# Patient Record
Sex: MALE | Race: WHITE | URBAN - METROPOLITAN AREA
[De-identification: names, ages, dates, MRNs, and addresses within clinical notes are randomized per-mention and may not be internally consistent; named-entity substitution may affect disease eponyms.]

---

## 2017-11-15 ENCOUNTER — PAT TELEPHONE (OUTPATIENT)
Dept: PREADMISSION TESTING | Age: 72
End: 2017-11-15

## 2017-11-15 VITALS — WEIGHT: 228 LBS | BODY MASS INDEX: 31.92 KG/M2 | HEIGHT: 71 IN

## 2017-11-15 RX ORDER — ATORVASTATIN CALCIUM 40 MG/1
40 TABLET, FILM COATED ORAL DAILY
COMMUNITY

## 2017-11-15 RX ORDER — RANITIDINE 150 MG/1
150 TABLET ORAL 2 TIMES DAILY
COMMUNITY

## 2017-11-15 NOTE — PROGRESS NOTES
you have a living will and a durable power of  for healthcare, please bring in a copy. As part of our patient safety program to minimize surgical site infections, we ask you to do the following:    · Please notify your surgeon if you develop any illness between         now and the  day of your surgery. · This includes a cough, cold, fever, sore throat, nausea,         or vomiting, and diarrhea, etc.  ·  Please notify your surgeon if you experience dizziness, shortness         of breath or blurred vision between now and the time of your surgery. You may shower the night before surgery or the morning of   your surgery with an antibacterial soap. You will need to bring a photo ID and insurance card    [facility] has an onsite pharmacy, would you like to utilize our pharmacy     If you will be staying overnight and use a C-pap machine, please bring   your C-pap to hospital     Our goal is to provide you with excellent care, therefore, visitors will be limited to two(2) in the room at a time so that we may focus on providing this care for you. Please contact pre-admission testing if you have any further questions. [facility] phone number:  [phone]  Please note these are generalized instructions for all surgical cases, you may be provided with more specific instructions according to your surgery.

## 2017-11-24 NOTE — ANESTHESIA PRE-OP
[facility] Department of Anesthesiology  Pre-Anesthesia Evaluation/Consultation       Name:  Cristi Rudd  : 1945  Age:  73 y.o. MRN:  [id number]  Date: 2017           Procedure (Scheduled):  Colonoscopy  Surgeon:  Dr. Pato Walls [Amoxicillin] Rash     There is no problem list on file for this patient. Past Medical History:   Diagnosis Date    CAD (coronary artery disease)     Diverticulitis     Hyperlipidemia     Hypertension      Past Surgical History:   Procedure Laterality Date    ABDOMEN SURGERY      bowel resection diveticulitis    CARDIAC SURGERY      triple by pass    FOOT SURGERY       Social History   Substance Use Topics    Smoking status: Never Smoker    Smokeless tobacco: Never Used    Alcohol use Yes     Medications  Current Outpatient Prescriptions on File Prior to Encounter   Medication Sig Dispense Refill    metoprolol tartrate (LOPRESSOR) 25 MG tablet Take 25 mg by mouth 2 times daily      ranitidine (ZANTAC) 150 MG tablet Take 150 mg by mouth 2 times daily      atorvastatin (LIPITOR) 40 MG tablet Take 40 mg by mouth daily      aspirin 81 MG tablet Take 81 mg by mouth daily       No current facility-administered medications on file prior to encounter.       Current Outpatient Prescriptions   Medication Sig Dispense Refill    metoprolol tartrate (LOPRESSOR) 25 MG tablet Take 25 mg by mouth 2 times daily      ranitidine (ZANTAC) 150 MG tablet Take 150 mg by mouth 2 times daily      atorvastatin (LIPITOR) 40 MG tablet Take 40 mg by mouth daily      aspirin 81 MG tablet Take 81 mg by mouth daily       Current Facility-Administered Medications   Medication Dose Route Frequency Provider Last Rate Last Dose    0.9 % sodium chloride infusion   Intravenous Continuous Henrik Billings MD        sodium chloride flush 0.9 % injection 10 mL  10 mL Intravenous 2 times per day Harjit Range Negative Endo/Other ROS                    Abdominal:           Vascular: negative vascular ROS. Anesthesia Plan      MAC     ASA 3       Induction: intravenous. Anesthetic plan and risks discussed with patient. Plan discussed with CRNA. This pre-anesthesia assessment may be used as a history and physical.    DOS STAFF ADDENDUM:    Pt seen and examined, chart reviewed (including anesthesia, drug and allergy history). No interval changes to history and physical examination. Anesthetic plan, risks, benefits, alternatives, and personnel involved discussed with patient. Patient verbalized an understanding and agrees to proceed.       Marin Farrell MD  November 27, 2017  12:36 PM

## 2017-11-27 ENCOUNTER — HOSPITAL ENCOUNTER (OUTPATIENT)
Dept: ENDOSCOPY | Age: 72
Discharge: HOME | End: 2017-11-27
Attending: INTERNAL MEDICINE | Admitting: INTERNAL MEDICINE

## 2017-11-27 VITALS
SYSTOLIC BLOOD PRESSURE: 118 MMHG | WEIGHT: 225.13 LBS | HEART RATE: 68 BPM | TEMPERATURE: 97 F | OXYGEN SATURATION: 98 % | DIASTOLIC BLOOD PRESSURE: 65 MMHG | HEIGHT: 71 IN | RESPIRATION RATE: 16 BRPM | BODY MASS INDEX: 31.52 KG/M2

## 2017-11-27 RX ORDER — SODIUM CHLORIDE 0.9 % (FLUSH) 0.9 %
10 SYRINGE (ML) INJECTION EVERY 12 HOURS SCHEDULED
Status: DISCONTINUED | OUTPATIENT
Start: 2017-11-27 | End: 2017-11-28 | Stop reason: HOSPADM

## 2017-11-27 RX ORDER — SODIUM CHLORIDE 0.9 % (FLUSH) 0.9 %
10 SYRINGE (ML) INJECTION PRN
Status: DISCONTINUED | OUTPATIENT
Start: 2017-11-27 | End: 2017-11-28 | Stop reason: HOSPADM

## 2017-11-27 RX ORDER — LABETALOL HYDROCHLORIDE 5 MG/ML
5 INJECTION, SOLUTION INTRAVENOUS EVERY 10 MIN PRN
Status: DISCONTINUED | OUTPATIENT
Start: 2017-11-27 | End: 2017-11-28 | Stop reason: HOSPADM

## 2017-11-27 RX ORDER — ONDANSETRON 2 MG/ML
4 INJECTION INTRAMUSCULAR; INTRAVENOUS
Status: ACTIVE | OUTPATIENT
Start: 2017-11-27 | End: 2017-11-27

## 2017-11-27 RX ORDER — SODIUM CHLORIDE 9 MG/ML
INJECTION, SOLUTION INTRAVENOUS CONTINUOUS
Status: DISCONTINUED | OUTPATIENT
Start: 2017-11-27 | End: 2017-11-28 | Stop reason: HOSPADM

## 2017-11-27 RX ORDER — PROMETHAZINE HYDROCHLORIDE 25 MG/ML
6.25 INJECTION, SOLUTION INTRAMUSCULAR; INTRAVENOUS
Status: ACTIVE | OUTPATIENT
Start: 2017-11-27 | End: 2017-11-27

## 2017-11-27 RX ADMIN — SODIUM CHLORIDE: 9 INJECTION, SOLUTION INTRAVENOUS at 12:46

## 2017-11-27 ASSESSMENT — PAIN - FUNCTIONAL ASSESSMENT: PAIN_FUNCTIONAL_ASSESSMENT: 0-10

## 2017-11-27 ASSESSMENT — PAIN SCALES - GENERAL
PAINLEVEL_OUTOF10: 0
PAINLEVEL_OUTOF10: 0

## 2017-11-27 ASSESSMENT — PAIN SCALES - WONG BAKER: WONGBAKER_NUMERICALRESPONSE: 0

## 2017-11-27 NOTE — BRIEF OP NOTE
Brief Postoperative Note  Ray Santos  [birth year]  [id number]    Previous Colonoscopy: Yes  Date: 11/09/12  Greater than 3 years?  Yes    Pre-operative Diagnosis: Polyp surveillance; family history of colon cancer    Post-operative Diagnosis: Same    Procedure: Colonoscopy    Anesthesia: MAC    Surgeons/Assistants: Ki    Estimated Blood Loss: None    Complications: None    Specimens: Was Obtained: Polyp    Findings: See dictated report    Electronically signed by Rubio Merida MD, on 11/27/2017, at 1:37 PM

## 2017-11-27 NOTE — H&P
ASA 3 - Patient with moderate systemic disease with functional limitations    III (soft palate, base of uvula visible)  ASSESSMENT AND PLAN:    1. Patient is a 73 y.o. male here for Colonoscopy  2. Procedure options, risks and benefits reviewed with patient who expresses understanding.

## 2017-11-28 NOTE — PROCEDURES
[address]                                COLONOSCOPY REPORT    PATIENT NAME: Elena Haley                       :        1945  MED REC NO:   [id number]                          ROOM:  ACCOUNT NO:   [de-identified]                          ADMIT DATE: 2017  PROVIDER:     Princess Henning MD    DATE OF PROCEDURE:  2017    REFERRING PROVIDER:  Zeb Hensley MD    PATIENT HISTORY:  A 82-year-old male, outpatient. INSTRUMENT USED:  Olympus PCF-H180AL. MEDICATIONS OF PROCEDURE:  The patient was premedicated with Diprivan  intravenously as administered by the anesthesiology service. INDICATIONS:  The patient has a history of colonic polyps and a family  history of colon cancer. He has had a previous partial resection for  diverticulitis. DESCRIPTION OF PROCEDURE:  The digital and anal exams were normal.  The  colonoscope was inserted to the cecum. The prep was good to fair. Where  the prep was fair, lesions such as small polyps or vascular ectasias could  have been missed. There was incidental sigmoid diverticulosis. The only  mucosal lesion identified was a diminutive sigmoid colon polyp, removed via  cold biopsy technique. IMPRESSION:  1. Colonic polyp, removed, path pending. 2.  Sigmoid diverticulosis. PLAN:  The patient will call the office for biopsy results. He will  require a surveillance colonoscopy in 5 years.         Nicole Green MD    D: 2017 13:51:46       T: 2017 13:53:17     [id number]  Job#: [id number]     Doc#: [id number]    CC:  MD Zeb Mcfarland MD

## 2023-06-02 RX ORDER — LISINOPRIL 5 MG/1
5 TABLET ORAL DAILY
COMMUNITY
Start: 2022-10-13

## 2023-06-02 RX ORDER — PANTOPRAZOLE SODIUM 20 MG/1
1 TABLET, DELAYED RELEASE ORAL DAILY
COMMUNITY
Start: 2020-09-10

## 2023-06-02 RX ORDER — CLOPIDOGREL BISULFATE 75 MG/1
75 TABLET ORAL DAILY
COMMUNITY
Start: 2022-01-24

## 2023-06-07 ENCOUNTER — ANESTHESIA EVENT (OUTPATIENT)
Dept: ENDOSCOPY | Age: 78
End: 2023-06-07
Payer: MEDICARE

## 2023-06-08 ENCOUNTER — ANESTHESIA (OUTPATIENT)
Dept: ENDOSCOPY | Age: 78
End: 2023-06-08
Payer: MEDICARE

## 2023-06-08 ENCOUNTER — HOSPITAL ENCOUNTER (OUTPATIENT)
Age: 78
Setting detail: OUTPATIENT SURGERY
Discharge: HOME | End: 2023-06-08
Attending: INTERNAL MEDICINE | Admitting: INTERNAL MEDICINE
Payer: MEDICARE

## 2023-06-08 VITALS
TEMPERATURE: 97.5 F | RESPIRATION RATE: 18 BRPM | DIASTOLIC BLOOD PRESSURE: 68 MMHG | SYSTOLIC BLOOD PRESSURE: 106 MMHG | HEIGHT: 70 IN | WEIGHT: 201 LBS | BODY MASS INDEX: 28.77 KG/M2 | HEART RATE: 53 BPM | OXYGEN SATURATION: 97 %

## 2023-06-08 DIAGNOSIS — Z80.0 FAMILY HISTORY OF COLON CANCER: ICD-10-CM

## 2023-06-08 LAB
GLUCOSE BLD-MCNC: 123 MG/DL (ref 70–99)
PERFORMED ON: ABNORMAL

## 2023-06-08 PROCEDURE — 3700000001 HC ADD 15 MINUTES (ANESTHESIA): Performed by: INTERNAL MEDICINE

## 2023-06-08 PROCEDURE — 3609010600 HC COLONOSCOPY POLYPECTOMY SNARE/COLD BIOPSY: Performed by: INTERNAL MEDICINE

## 2023-06-08 PROCEDURE — 6360000002 HC RX W HCPCS: Performed by: NURSE ANESTHETIST, CERTIFIED REGISTERED

## 2023-06-08 PROCEDURE — 3700000000 HC ANESTHESIA ATTENDED CARE: Performed by: INTERNAL MEDICINE

## 2023-06-08 PROCEDURE — 2580000003 HC RX 258: Performed by: ANESTHESIOLOGY

## 2023-06-08 PROCEDURE — 2709999900 HC NON-CHARGEABLE SUPPLY: Performed by: INTERNAL MEDICINE

## 2023-06-08 PROCEDURE — 7100000011 HC PHASE II RECOVERY - ADDTL 15 MIN: Performed by: INTERNAL MEDICINE

## 2023-06-08 PROCEDURE — 7100000010 HC PHASE II RECOVERY - FIRST 15 MIN: Performed by: INTERNAL MEDICINE

## 2023-06-08 PROCEDURE — 88305 TISSUE EXAM BY PATHOLOGIST: CPT

## 2023-06-08 RX ORDER — SODIUM CHLORIDE 0.9 % (FLUSH) 0.9 %
5-40 SYRINGE (ML) INJECTION EVERY 12 HOURS SCHEDULED
Status: CANCELLED | OUTPATIENT
Start: 2023-06-08

## 2023-06-08 RX ORDER — SODIUM CHLORIDE 0.9 % (FLUSH) 0.9 %
5-40 SYRINGE (ML) INJECTION EVERY 12 HOURS SCHEDULED
Status: DISCONTINUED | OUTPATIENT
Start: 2023-06-08 | End: 2023-06-08 | Stop reason: HOSPADM

## 2023-06-08 RX ORDER — SODIUM CHLORIDE 9 MG/ML
INJECTION, SOLUTION INTRAVENOUS PRN
Status: DISCONTINUED | OUTPATIENT
Start: 2023-06-08 | End: 2023-06-08 | Stop reason: HOSPADM

## 2023-06-08 RX ORDER — SODIUM CHLORIDE 9 MG/ML
INJECTION, SOLUTION INTRAVENOUS PRN
Status: CANCELLED | OUTPATIENT
Start: 2023-06-08

## 2023-06-08 RX ORDER — SODIUM CHLORIDE 0.9 % (FLUSH) 0.9 %
5-40 SYRINGE (ML) INJECTION PRN
Status: DISCONTINUED | OUTPATIENT
Start: 2023-06-08 | End: 2023-06-08 | Stop reason: HOSPADM

## 2023-06-08 RX ORDER — SODIUM CHLORIDE 0.9 % (FLUSH) 0.9 %
5-40 SYRINGE (ML) INJECTION PRN
Status: CANCELLED | OUTPATIENT
Start: 2023-06-08

## 2023-06-08 RX ORDER — PROPOFOL 10 MG/ML
INJECTION, EMULSION INTRAVENOUS CONTINUOUS PRN
Status: DISCONTINUED | OUTPATIENT
Start: 2023-06-08 | End: 2023-06-08 | Stop reason: SDUPTHER

## 2023-06-08 RX ORDER — ONDANSETRON 2 MG/ML
4 INJECTION INTRAMUSCULAR; INTRAVENOUS
Status: CANCELLED | OUTPATIENT
Start: 2023-06-08 | End: 2023-06-09

## 2023-06-08 RX ADMIN — PROPOFOL 150 MCG/KG/MIN: 10 INJECTION, EMULSION INTRAVENOUS at 10:11

## 2023-06-08 RX ADMIN — SODIUM CHLORIDE: 9 INJECTION, SOLUTION INTRAVENOUS at 09:33

## 2023-06-08 ASSESSMENT — PAIN SCALES - GENERAL
PAINLEVEL_OUTOF10: 0
PAINLEVEL_OUTOF10: 0

## 2023-06-08 ASSESSMENT — PAIN - FUNCTIONAL ASSESSMENT: PAIN_FUNCTIONAL_ASSESSMENT: 0-10

## 2023-06-08 ASSESSMENT — PAIN SCALES - WONG BAKER: WONGBAKER_NUMERICALRESPONSE: 0

## 2023-06-08 NOTE — ANESTHESIA POSTPROCEDURE EVALUATION
[facility] Department of Anesthesiology  Post-Anesthesia Note       Name:  Esha Ferguson                                  Age:  78 y.o. MRN:  [id number]     Last Vitals & Oxygen Saturation: /68   Pulse 53   Temp 97.5 °F (36.4 °C) (Temporal)   Resp 18   Ht 5' 10\" (1.778 m)   Wt 201 lb (91.2 kg)   SpO2 97%   BMI 28.84 kg/m²   Patient Vitals for the past 4 hrs:   BP Temp Temp src Pulse Resp SpO2 Height Weight   06/08/23 1051 106/68 -- -- 53 18 97 % -- --   06/08/23 1041 95/66 -- -- 65 16 98 % -- --   06/08/23 1033 (!) 88/56 97.5 °F (36.4 °C) Temporal 56 16 95 % -- --   06/08/23 0921 118/65 98.5 °F (36.9 °C) Temporal 57 18 98 % 5' 10\" (1.778 m) 201 lb (91.2 kg)       Level of consciousness:  Awake, alert    Respiratory: Respirations easy, no distress. Stable. Cardiovascular: Hemodynamically stable. Hydration: Adequate. PONV: Adequately managed. Post-op pain: Adequately controlled. Post-op assessment: Tolerated anesthetic well without complication. Complications:  None.     Cele Ley MD  June 8, 2023   12:05 PM

## 2023-06-08 NOTE — ANESTHESIA PRE PROCEDURE
[facility] Department of Anesthesiology  Pre-Anesthesia Evaluation/Consultation       Name:  Alex Sherwood  : 1945  Age:  78 y.o. MRN:  [id number]  Date: 2023           Surgeon: Surgeon(s):  Mookie Sears MD    Procedure: Procedure(s):  COLORECTAL CANCER SCREENING, HIGH RISK     Allergies   Allergen Reactions    Amoxil [Amoxicillin] Rash     There is no problem list on file for this patient. Past Medical History:   Diagnosis Date    Acid reflux     CAD (coronary artery disease)     Diverticulitis     Enlarged prostate     Hyperlipidemia     Hypertension     Thyroid disease     partial thyroidectomy     Past Surgical History:   Procedure Laterality Date    ABDOMEN SURGERY  2006    bowel resection diveticulitis    CARDIAC SURGERY      triple by pass    CARDIAC SURGERY  2014    4 stents    COLONOSCOPY  2017    manegold    FOOT SURGERY      THYROIDECTOMY      partial     Social History     Tobacco Use    Smoking status: Never    Smokeless tobacco: Never   Vaping Use    Vaping Use: Never used   Substance Use Topics    Alcohol use: Yes     Comment: occassional    Drug use: Never     Medications  No current facility-administered medications on file prior to encounter.      Current Outpatient Medications on File Prior to Encounter   Medication Sig Dispense Refill    dapagliflozin (FARXIGA) 5 MG tablet Take 1 tablet by mouth daily      clopidogrel (PLAVIX) 75 MG tablet Take 1 tablet by mouth daily      lisinopril (PRINIVIL;ZESTRIL) 5 MG tablet Take 1 tablet by mouth daily      pantoprazole (PROTONIX) 20 MG tablet Take 1 tablet by mouth daily      Insulin Degludec 200 UNIT/ML SOPN Use 10 units nightly      metoprolol tartrate (LOPRESSOR) 25 MG tablet Take 1 tablet by mouth 2 times daily      atorvastatin (LIPITOR) 40 MG tablet Take 1 tablet by mouth daily      aspirin 81 MG tablet Take 1 tablet by mouth daily       Current

## 2023-06-08 NOTE — OP NOTE
COLONOSCOPY     Patient: Ondina Patel MRN: [id number]   YOB: 1945 Age: 78 y.o. Sex: male       Admitting Physician: Neri Luna     Primary Care Physician: Marvin Loredo DO      DATE OF PROCEDURE: 6/8/2023  PROCEDURE: Colonoscopy    PREOPERATIVE DIAGNOSIS: Colon cancer screening. Family history of colon cancer  HPI: This is a 78y.o. year old male who presents today for colon cancer screening and screening colonoscopy. The patient has a family history of colon cancer in his mother diagnosed later in life  ENDOSCOPIST: Matteo Moran MD    POSTOPERATIVE DIAGNOSIS:    1.  Small cecal polyp, cold snared and removed  2. Diverticulosis    PLAN:   1. Follow-up biopsies; the patient to contact me in 1 week for results  2. Tentative surveillance colonoscopy in 5 years, health permitting    INFORMED CONSENT:  Informed consent for colonoscopy was obtained. The benefits and risks including adverse medicine reaction and perforation have been explained. The patient's questions were answered and the patient agreed to proceed. ASA: ASA 2 - Patient with mild systemic disease with no functional limitations     SEDATION: MAC    The patient's vital signs, cardiac status, pulmonary status, abdominal status and mental status were stable for the procedure. The patient's vital signs and respiratory function as monitored by oxygen saturation remained stable. COLON PREPARATION:  The patient was given a split colon preparation and the preparation was adequate. Procedure Details: An anal exam was performed and this was unremarkable. A digital rectal exam was performed and no masses palpated. The Olympus videocolonoscope  was inserted in the rectum and carefully advanced to the cecum as identified by IC valve, crow's foot appearance and appendix. The cecum was photodocumented.   The colonoscope was slowly withdrawn and retrograde examination of the colon was carefully performed with inspection

## 2023-06-08 NOTE — H&P
Historical Provider, MD   pantoprazole (PROTONIX) 20 MG tablet Take 1 tablet by mouth daily 9/10/20  Yes Historical Provider, MD   Insulin Degludec 200 UNIT/ML SOPN Use 10 units nightly 12/28/21  Yes Historical Provider, MD   metoprolol tartrate (LOPRESSOR) 25 MG tablet Take 1 tablet by mouth 2 times daily    Historical Provider, MD   atorvastatin (LIPITOR) 40 MG tablet Take 1 tablet by mouth daily    Historical Provider, MD   aspirin 81 MG tablet Take 1 tablet by mouth daily    Historical Provider, MD       Vital Signs: /65   Pulse 57   Temp 98.5 °F (36.9 °C) (Temporal)   Resp 18   Ht 5' 10\" (1.778 m)   Wt 201 lb (91.2 kg)   SpO2 98%   BMI 28.84 kg/m²   Physical Exam:   Heart: regular   Lungs: non-labored breathing  Mental status:  Alert and oriented    ASA score:  ASA 2 - Patient with mild systemic disease with no functional limitations{  Mallimpati score:  2     Planned Procedure: Colonoscopy    Planned Sedation: Monitored anesthesia.     Signed By: Ofelia Zendejas MD   June 8, 2023

## 2023-06-10 NOTE — RESULT ENCOUNTER NOTE
Surveillance colonoscopy for family history of colon cancer done in JuneJune 8 showed a small cecal adenoma which was removed.   I recommend a surveillance colonoscopy in 5 years, health permitting.

## (undated) DEVICE — SNARE ENDOSCP POLYP 2.4 MM 240 CM 10 MM 2.8 MM CAPTIVATOR